# Patient Record
Sex: MALE | Race: WHITE | Employment: OTHER | URBAN - NONMETROPOLITAN AREA
[De-identification: names, ages, dates, MRNs, and addresses within clinical notes are randomized per-mention and may not be internally consistent; named-entity substitution may affect disease eponyms.]

---

## 2020-01-23 LAB
APTT: 24.6 SEC (ref 26–36.2)
BASOPHILS ABSOLUTE: 0 K/UL (ref 0–0.2)
BASOPHILS RELATIVE PERCENT: 0.4 % (ref 0–1)
EOSINOPHILS ABSOLUTE: 0.1 K/UL (ref 0–0.6)
EOSINOPHILS RELATIVE PERCENT: 1.4 % (ref 0–5)
HCT VFR BLD CALC: 46.7 % (ref 42–52)
HEMOGLOBIN: 15 G/DL (ref 14–18)
IMMATURE GRANULOCYTES #: 0 K/UL
INR BLD: 1.11 (ref 0.88–1.18)
LYMPHOCYTES ABSOLUTE: 2.5 K/UL (ref 1.1–4.5)
LYMPHOCYTES RELATIVE PERCENT: 28 % (ref 20–40)
MCH RBC QN AUTO: 29.4 PG (ref 27–31)
MCHC RBC AUTO-ENTMCNC: 32.1 G/DL (ref 33–37)
MCV RBC AUTO: 91.4 FL (ref 80–94)
MONOCYTES ABSOLUTE: 0.8 K/UL (ref 0–0.9)
MONOCYTES RELATIVE PERCENT: 8.3 % (ref 0–10)
NEUTROPHILS ABSOLUTE: 5.6 K/UL (ref 1.5–7.5)
NEUTROPHILS RELATIVE PERCENT: 61.6 % (ref 50–65)
PDW BLD-RTO: 13.5 % (ref 11.5–14.5)
PLATELET # BLD: 242 K/UL (ref 130–400)
PMV BLD AUTO: 9.9 FL (ref 9.4–12.4)
PROTHROMBIN TIME: 13.7 SEC (ref 12–14.6)
RBC # BLD: 5.11 M/UL (ref 4.7–6.1)
WBC # BLD: 9 K/UL (ref 4.8–10.8)

## 2020-05-20 ENCOUNTER — HOSPITAL ENCOUNTER (OUTPATIENT)
Dept: NON INVASIVE DIAGNOSTICS | Age: 60
Discharge: HOME | End: 2020-05-20
Payer: COMMERCIAL

## 2020-05-20 PROCEDURE — 93971 EXTREMITY STUDY: CPT

## 2020-09-09 ENCOUNTER — OFFICE VISIT (OUTPATIENT)
Age: 60
End: 2020-09-09

## 2020-09-09 VITALS — TEMPERATURE: 97.8 F | HEART RATE: 81 BPM | OXYGEN SATURATION: 96 %

## 2020-09-09 PROCEDURE — 99999 PR OFFICE/OUTPT VISIT,PROCEDURE ONLY: CPT | Performed by: NURSE PRACTITIONER

## 2020-09-12 LAB — SARS-COV-2, NAA: NOT DETECTED

## 2020-12-23 ENCOUNTER — HOSPITAL ENCOUNTER (OUTPATIENT)
Dept: NON INVASIVE DIAGNOSTICS | Age: 60
Discharge: HOME | End: 2020-12-23
Payer: COMMERCIAL

## 2020-12-23 PROCEDURE — 93971 EXTREMITY STUDY: CPT

## 2021-01-01 ENCOUNTER — HOSPITAL ENCOUNTER (EMERGENCY)
Age: 61
Discharge: HOME | End: 2021-01-01
Attending: EMERGENCY MEDICINE
Payer: COMMERCIAL

## 2021-01-01 ENCOUNTER — APPOINTMENT (OUTPATIENT)
Dept: CT IMAGING | Age: 61
End: 2021-01-01
Payer: COMMERCIAL

## 2021-01-01 VITALS
SYSTOLIC BLOOD PRESSURE: 122 MMHG | OXYGEN SATURATION: 96 % | HEART RATE: 104 BPM | DIASTOLIC BLOOD PRESSURE: 90 MMHG | RESPIRATION RATE: 18 BRPM | TEMPERATURE: 98.5 F

## 2021-01-01 DIAGNOSIS — M51.36 BULGING OF LUMBAR INTERVERTEBRAL DISC: ICD-10-CM

## 2021-01-01 DIAGNOSIS — M51.36 DDD (DEGENERATIVE DISC DISEASE), LUMBAR: Primary | ICD-10-CM

## 2021-01-01 DIAGNOSIS — M48.061 LUMBAR FORAMINAL STENOSIS: ICD-10-CM

## 2021-01-01 DIAGNOSIS — M54.32 SCIATICA, LEFT SIDE: ICD-10-CM

## 2021-01-01 LAB
ALBUMIN SERPL-MCNC: 4.3 G/DL (ref 3.5–5.2)
ALP BLD-CCNC: 69 U/L (ref 40–130)
ALT SERPL-CCNC: 25 U/L (ref 5–41)
ANION GAP SERPL CALCULATED.3IONS-SCNC: 12 MMOL/L (ref 7–19)
APTT: 28.5 SEC (ref 26–36.2)
AST SERPL-CCNC: 18 U/L (ref 5–40)
BASOPHILS ABSOLUTE: 0.1 K/UL (ref 0–0.2)
BASOPHILS RELATIVE PERCENT: 0.6 % (ref 0–1)
BILIRUB SERPL-MCNC: 0.5 MG/DL (ref 0.2–1.2)
BUN BLDV-MCNC: 15 MG/DL (ref 6–20)
CALCIUM SERPL-MCNC: 9.6 MG/DL (ref 8.6–10)
CHLORIDE BLD-SCNC: 99 MMOL/L (ref 98–111)
CO2: 24 MMOL/L (ref 22–29)
CREAT SERPL-MCNC: 0.9 MG/DL (ref 0.5–1.2)
EOSINOPHILS ABSOLUTE: 0.1 K/UL (ref 0–0.6)
EOSINOPHILS RELATIVE PERCENT: 0.8 % (ref 0–5)
GFR AFRICAN AMERICAN: >59
GFR NON-AFRICAN AMERICAN: >60
GLUCOSE BLD-MCNC: 141 MG/DL (ref 74–109)
HCT VFR BLD CALC: 51 % (ref 42–52)
HEMOGLOBIN: 16.7 G/DL (ref 14–18)
IMMATURE GRANULOCYTES #: 0 K/UL
INR BLD: 1.58 (ref 0.88–1.18)
LYMPHOCYTES ABSOLUTE: 2.6 K/UL (ref 1.1–4.5)
LYMPHOCYTES RELATIVE PERCENT: 24.1 % (ref 20–40)
MCH RBC QN AUTO: 28.8 PG (ref 27–31)
MCHC RBC AUTO-ENTMCNC: 32.7 G/DL (ref 33–37)
MCV RBC AUTO: 87.9 FL (ref 80–94)
MONOCYTES ABSOLUTE: 0.9 K/UL (ref 0–0.9)
MONOCYTES RELATIVE PERCENT: 8.2 % (ref 0–10)
NEUTROPHILS ABSOLUTE: 7.1 K/UL (ref 1.5–7.5)
NEUTROPHILS RELATIVE PERCENT: 65.9 % (ref 50–65)
PDW BLD-RTO: 13.6 % (ref 11.5–14.5)
PLATELET # BLD: 266 K/UL (ref 130–400)
PMV BLD AUTO: 9.5 FL (ref 9.4–12.4)
POTASSIUM REFLEX MAGNESIUM: 4.1 MMOL/L (ref 3.5–5)
PROTHROMBIN TIME: 19 SEC (ref 12–14.6)
RBC # BLD: 5.8 M/UL (ref 4.7–6.1)
SODIUM BLD-SCNC: 135 MMOL/L (ref 136–145)
TOTAL PROTEIN: 7.7 G/DL (ref 6.6–8.7)
WBC # BLD: 10.8 K/UL (ref 4.8–10.8)

## 2021-01-01 PROCEDURE — 85730 THROMBOPLASTIN TIME PARTIAL: CPT

## 2021-01-01 PROCEDURE — 36415 COLL VENOUS BLD VENIPUNCTURE: CPT

## 2021-01-01 PROCEDURE — 99283 EMERGENCY DEPT VISIT LOW MDM: CPT

## 2021-01-01 PROCEDURE — 93971 EXTREMITY STUDY: CPT

## 2021-01-01 PROCEDURE — 72131 CT LUMBAR SPINE W/O DYE: CPT

## 2021-01-01 PROCEDURE — 85025 COMPLETE CBC W/AUTO DIFF WBC: CPT

## 2021-01-01 PROCEDURE — 80053 COMPREHEN METABOLIC PANEL: CPT

## 2021-01-01 PROCEDURE — 85610 PROTHROMBIN TIME: CPT

## 2021-01-01 RX ORDER — TRAZODONE HYDROCHLORIDE 100 MG/1
100 TABLET ORAL NIGHTLY
COMMUNITY

## 2021-01-01 RX ORDER — HYDROCODONE BITARTRATE AND ACETAMINOPHEN 7.5; 325 MG/1; MG/1
1 TABLET ORAL EVERY 6 HOURS PRN
COMMUNITY

## 2021-01-01 RX ORDER — METHOCARBAMOL 750 MG/1
750 TABLET, FILM COATED ORAL 4 TIMES DAILY
COMMUNITY

## 2021-01-01 RX ORDER — METHYLPREDNISOLONE 4 MG/1
TABLET ORAL
Qty: 1 KIT | Refills: 0 | Status: SHIPPED | OUTPATIENT
Start: 2021-01-01 | End: 2021-01-07

## 2021-01-01 RX ORDER — LISINOPRIL 20 MG/1
20 TABLET ORAL DAILY
COMMUNITY

## 2021-01-01 ASSESSMENT — ENCOUNTER SYMPTOMS
EYE DISCHARGE: 0
COLOR CHANGE: 0
PHOTOPHOBIA: 0
COUGH: 0
SHORTNESS OF BREATH: 0
EYE ITCHING: 0
APNEA: 0
BACK PAIN: 1

## 2021-01-01 ASSESSMENT — PAIN SCALES - GENERAL: PAINLEVEL_OUTOF10: 10

## 2021-01-01 NOTE — ED PROVIDER NOTES
[facility] EMERGENCY DEPT  eMERGENCYdEPARTMENT eNCOUnter      Pt Name: Joya Phillips  MRN: [id number]  [place] 1960  Date of evaluation: 1/1/2021  JUDD Chavez    CHIEF COMPLAINT       Chief Complaint   Patient presents with    Leg Pain     left leg, possible sciatic nerve pain         HISTORY OF PRESENT ILLNESS  (Location/Symptom, Timing/Onset, Context/Setting, Quality, Duration, Modifying Factors, Severity.)   Joya Phillips is a 62 y.o. male who presents to the emergency department with complaints of left leg pain worst in groin with lumbago 1 mo onset admits to heavy lifting and yoga hx. He has hx of clot in r leg on xarelto followed by dr Maddison Wilson states has been resolving with US. Denies specific injury. No wounds discoloration or unilateral swelling. Pain is left low back with buttock and thigh involvement. Feels tingling in his entire left leg no weakness walking. No bowel bladder incontinence worse when seating is standing when I enter the room. Pain is dull ache rates as 7/10 when sitting down. No prior disc hx or sciatic hx. Michael Tiwari request #[id number]    0 active cumulative morphine equivalents. HPI    Nursing Notes were reviewed and I agree. REVIEW OF SYSTEMS    (2-9 systems for level 4, 10 or more for level 5)     Review of Systems   Constitutional: Negative for activity change, appetite change, chills and fever. HENT: Negative for congestion and dental problem. Eyes: Negative for photophobia, discharge and itching. Respiratory: Negative for apnea, cough and shortness of breath. Cardiovascular: Negative for chest pain. Musculoskeletal: Positive for arthralgias, back pain and myalgias. Negative for gait problem and neck pain. Skin: Negative for color change, pallor and rash. Neurological: Negative for dizziness, seizures and syncope. Psychiatric/Behavioral: Negative for agitation. The patient is not nervous/anxious.          Except as noted above the remainder of the review of systems was reviewed and negative. PAST MEDICAL HISTORY     Past Medical History:   Diagnosis Date    Arthritis     Insomnia          SURGICAL HISTORY     History reviewed. No pertinent surgical history. CURRENT MEDICATIONS       Discharge Medication List as of 1/1/2021  7:16 PM      CONTINUE these medications which have NOT CHANGED    Details   rivaroxaban (XARELTO) 20 MG TABS tablet Take 20 mg by mouthHistorical Med      lisinopril (PRINIVIL;ZESTRIL) 20 MG tablet Take 20 mg by mouth dailyHistorical Med      methocarbamol (ROBAXIN) 750 MG tablet Take 750 mg by mouth 4 times dailyHistorical Med      traZODone (DESYREL) 100 MG tablet Take 100 mg by mouth nightlyHistorical Med      HYDROcodone-acetaminophen (NORCO) 7.5-325 MG per tablet Take 1 tablet by mouth every 6 hours as needed for Pain. Historical Med      ZOLPIDEM TARTRATE PO Take by mouthHistorical Med             ALLERGIES     Patient has no known allergies. FAMILY HISTORY     History reviewed. No pertinent family history.        SOCIAL HISTORY       Social History     Socioeconomic History    Marital status:      Spouse name: None    Number of children: None    Years of education: None    Highest education level: None   Occupational History    None   Social Needs    Financial resource strain: None    Food insecurity     Worry: None     Inability: None    Transportation needs     Medical: None     Non-medical: None   Tobacco Use    Smoking status: Never Smoker    Smokeless tobacco: Never Used   Substance and Sexual Activity    Alcohol use: Not Currently     Comment: sporadic    Drug use: Not Currently    Sexual activity: Not Currently   Lifestyle    Physical activity     Days per week: None     Minutes per session: None    Stress: None   Relationships    Social connections     Talks on phone: None     Gets together: None     Attends Mandaeism service: None     Active member of club or organization: None Attends meetings of clubs or organizations: None     Relationship status: None    Intimate partner violence     Fear of current or ex partner: None     Emotionally abused: None     Physically abused: None     Forced sexual activity: None   Other Topics Concern    None   Social History Narrative    None       SCREENINGS           PHYSICAL EXAM    (up to 7 forlevel 4, 8 or more for level 5)     ED Triage Vitals   BP Temp Temp src Pulse Resp SpO2 Height Weight   01/01/21 1631 01/01/21 1630 -- 01/01/21 1630 01/01/21 1630 01/01/21 1630 -- --   (!) 122/90 98.5 °F (36.9 °C)  104 18 96 %         Physical Exam  Vitals signs and nursing note reviewed. Constitutional:       General: He is not in acute distress. Appearance: Normal appearance. He is well-developed. He is not diaphoretic. HENT:      Head: Normocephalic and atraumatic. Right Ear: Tympanic membrane, ear canal and external ear normal.      Left Ear: Tympanic membrane, ear canal and external ear normal.   Eyes:      Pupils: Pupils are equal, round, and reactive to light. Neck:      Musculoskeletal: Normal range of motion and neck supple. Trachea: No tracheal deviation. Cardiovascular:      Rate and Rhythm: Normal rate and regular rhythm. Pulses: Normal pulses. Heart sounds: Normal heart sounds. No murmur. Pulmonary:      Effort: Pulmonary effort is normal.      Breath sounds: Normal breath sounds. No stridor. No wheezing. Chest:      Chest wall: No tenderness. Abdominal:      General: Bowel sounds are normal. There is no distension. Palpations: Abdomen is soft. Tenderness: There is no abdominal tenderness. Musculoskeletal: Normal range of motion. General: Tenderness present. No signs of injury. Comments: No pain created with internal external rotation of the left hip. Skin:     General: Skin is warm and dry. Capillary Refill: Capillary refill takes less than 2 seconds.  Both legs of same size no unilateral swelling or coldness. Palpable pulses 2-3+ feet popliteal and inguinal.  Neurological:      General: No focal deficit present. Mental Status: He is alert and oriented to person, place, and time. Mental status is at baseline. Psychiatric:         Mood and Affect: Mood normal.         Behavior: Behavior normal.         Thought Content: Thought content normal.         Judgment: Judgment normal.           DIAGNOSTIC RESULTS     RADIOLOGY:   Non-plain film images such as CT, Ultrasound and MRI are read by the radiologist. Braulio Bosch radiographic images are visualized and preliminarilyinterpreted by No att. providers found with the below findings:      Interpretation per the Radiologist below, if available at the time of this note:    CT Lumbar Spine WO Contrast   Final Result   1.. No evidence of acute fracture. There is bilateral spondylolysis   with 10 mm of anterolisthesis of L5 on S1. Severe bilateral neural   foraminal narrowing is noted at this level. 2. Bulging of the disc as well as facet and ligamentous hypertrophy   are noted at several levels. Endplate spurring also contributes to   central and foraminal stenosis as described above. .   Signed by Dr Maria Alejandra Torres on 1/1/2021 6:15 PM      VL Extremity Venous Left             LABS:  Labs Reviewed   CBC WITH AUTO DIFFERENTIAL - Abnormal; Notable for the following components:       Result Value    MCHC 32.7 (*)     Neutrophils % 65.9 (*)     All other components within normal limits   COMPREHENSIVE METABOLIC PANEL W/ REFLEX TO MG FOR LOW K - Abnormal; Notable for the following components:    Sodium 135 (*)     Glucose 141 (*)     All other components within normal limits   PROTIME-INR - Abnormal; Notable for the following components:    Protime 19.0 (*)     INR 1.58 (*)     All other components within normal limits   APTT       All other labs were within normal range or notreturned as of this dictation.     RE-ASSESSMENT        EMERGENCY DEPARTMENT COURSE and DIFFERENTIAL DIAGNOSIS/MDM:   Vitals:    Vitals:    01/01/21 1630 01/01/21 1631   BP:  (!) 122/90   Pulse: 104    Resp: 18    Temp: 98.5 °F (36.9 °C)    SpO2: 96%        MDM  Findings consistent with bulging disc disease and for mental stenosis have given him medication here that helped significantly. He continues to ambulate without issue I would advise him he can take medication at home for inflammation pain and follow closely with neurosurgery. If anything should change emergently would need to come back here for potential MRI. He will follow up with neurosurgery with referral from his PCP. Discharge at this time. His significant other is a physical therapist to work on exercise at home with him I have texted her over his phone his findings and instructions. PROCEDURES:    Procedures      FINAL IMPRESSION      1. DDD (degenerative disc disease), lumbar    2. Bulging of lumbar intervertebral disc    3. Sciatica, left side    4.  Lumbar foraminal stenosis          DISPOSITION/PLAN   DISPOSITION Decision To Discharge 01/01/2021 07:20:51 PM      PATIENT REFERRED TO:  [address] [place] [address] EMERGENCY DEPT  [facility]  [phone]    If symptoms worsen    Daisy Xiong MD  [address] [place] [address]  [phone]      neurosurgery referral    [facility]  [phone]    Schedule an appointment as soon as possible for a visit         DISCHARGE MEDICATIONS:  Discharge Medication List as of 1/1/2021  7:16 PM      START taking these medications    Details   naproxen (NAPROXEN) 500 MG EC tablet Take 1 tablet by mouth 2 times daily (with meals), Disp-60 tablet, R-3Print      methylPREDNISolone (MEDROL, ASHLY,) 4 MG tablet Take by mouth., Disp-1 kit, R-0Print             (Please note that portions of this note were completed with a voice recognition program.  Efforts were made to edit the dictations but occasionallywords are mis-transcribed.)    Jocy Mariscal [address], [place]  01/01/21 2105

## 2021-01-01 NOTE — ED PROVIDER NOTES
Attending Supervisory Note/Shared Visit    I have reviewed the mid-levels findings and agree. We have discussed the case and reviewed the diagnostic data together. I am in agreement with the plan and disposition.   Hortencia Chase MD  Attending Emergency Physician        Jimmy Carrillo MD  01/01/21 6451

## 2021-01-04 ENCOUNTER — TRANSCRIBE ORDERS (OUTPATIENT)
Dept: ADMINISTRATIVE | Facility: HOSPITAL | Age: 61
End: 2021-01-04

## 2021-01-04 DIAGNOSIS — M54.50 ACUTE LOW BACK PAIN, UNSPECIFIED BACK PAIN LATERALITY, UNSPECIFIED WHETHER SCIATICA PRESENT: Primary | ICD-10-CM

## 2021-01-13 ENCOUNTER — HOSPITAL ENCOUNTER (OUTPATIENT)
Dept: MRI IMAGING | Facility: HOSPITAL | Age: 61
Discharge: HOME | End: 2021-01-13
Admitting: FAMILY MEDICINE

## 2021-01-13 PROCEDURE — 72148 MRI LUMBAR SPINE W/O DYE: CPT

## 2021-02-06 ENCOUNTER — APPOINTMENT (OUTPATIENT)
Dept: GENERAL RADIOLOGY | Age: 61
End: 2021-02-06
Payer: COMMERCIAL

## 2021-02-06 ENCOUNTER — HOSPITAL ENCOUNTER (EMERGENCY)
Age: 61
Discharge: HOME | End: 2021-02-06
Payer: COMMERCIAL

## 2021-02-06 ENCOUNTER — APPOINTMENT (OUTPATIENT)
Dept: CT IMAGING | Age: 61
End: 2021-02-06
Payer: COMMERCIAL

## 2021-02-06 VITALS
HEART RATE: 85 BPM | BODY MASS INDEX: 35.55 KG/M2 | RESPIRATION RATE: 16 BRPM | SYSTOLIC BLOOD PRESSURE: 130 MMHG | HEIGHT: 69 IN | WEIGHT: 240 LBS | DIASTOLIC BLOOD PRESSURE: 83 MMHG | TEMPERATURE: 98 F | OXYGEN SATURATION: 97 %

## 2021-02-06 DIAGNOSIS — M25.559 HIP PAIN: ICD-10-CM
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
## 2021-02-06 DIAGNOSIS — S70.01XA CONTUSION OF RIGHT HIP, INITIAL ENCOUNTER: Primary | ICD-10-CM

## 2021-02-06 PROCEDURE — 99283 EMERGENCY DEPT VISIT LOW MDM: CPT

## 2021-02-06 PROCEDURE — 70450 CT HEAD/BRAIN W/O DYE: CPT

## 2021-02-06 PROCEDURE — 73502 X-RAY EXAM HIP UNI 2-3 VIEWS: CPT

## 2021-02-06 ASSESSMENT — ENCOUNTER SYMPTOMS
ABDOMINAL PAIN: 0
SHORTNESS OF BREATH: 0

## 2021-02-06 NOTE — ED PROVIDER NOTES
(ROBAXIN) 750 MG TABLET    Take 750 mg by mouth 4 times daily    NAPROXEN (NAPROXEN) 500 MG EC TABLET    Take 1 tablet by mouth 2 times daily (with meals)    RIVAROXABAN (XARELTO) 20 MG TABS TABLET    Take 20 mg by mouth    TRAZODONE (DESYREL) 100 MG TABLET    Take 100 mg by mouth nightly    ZOLPIDEM TARTRATE PO    Take by mouth       ALLERGIES     Patient has no known allergies. FAMILY HISTORY     History reviewed. No pertinent family history.        SOCIAL HISTORY       Social History     Socioeconomic History    Marital status:      Spouse name: None    Number of children: None    Years of education: None    Highest education level: None   Occupational History    None   Social Needs    Financial resource strain: None    Food insecurity     Worry: None     Inability: None    Transportation needs     Medical: None     Non-medical: None   Tobacco Use    Smoking status: Never Smoker    Smokeless tobacco: Never Used   Substance and Sexual Activity    Alcohol use: Not Currently     Comment: sporadic    Drug use: Yes     Types: Marijuana     Comment: occ    Sexual activity: Not Currently   Lifestyle    Physical activity     Days per week: None     Minutes per session: None    Stress: None   Relationships    Social connections     Talks on phone: None     Gets together: None     Attends Yazdanism service: None     Active member of club or organization: None     Attends meetings of clubs or organizations: None     Relationship status: None    Intimate partner violence     Fear of current or ex partner: None     Emotionally abused: None     Physically abused: None     Forced sexual activity: None   Other Topics Concern    None   Social History Narrative    None       SCREENINGS             PHYSICAL EXAM    (up to 7 for level 4, 8 or more for level 5)     ED Triage Vitals [02/06/21 1218]   BP Temp Temp src Pulse Resp SpO2 Height Weight   129/88 97.5 °F (36.4 °C) -- 85 18 94 % 5' 9\" (1.753 m) 240 lb (108.9 kg)       Physical Exam  Vitals signs reviewed. HENT:      Head: Atraumatic. Right Ear: External ear normal.      Left Ear: External ear normal.   Eyes:      Conjunctiva/sclera: Conjunctivae normal.      Pupils: Pupils are equal, round, and reactive to light. Neck:      Musculoskeletal: Normal range of motion. Comments: No direct ttp cervical spine  Cardiovascular:      Rate and Rhythm: Normal rate and regular rhythm. Heart sounds: Normal heart sounds. Pulmonary:      Effort: Pulmonary effort is normal.      Breath sounds: Normal breath sounds. Abdominal:      General: Bowel sounds are normal.      Palpations: Abdomen is soft. Tenderness: There is no abdominal tenderness. Musculoskeletal: Normal range of motion. Comments: Painful active flexion of right hip  Mild ttp right lateral hip  No direct ttp thoracic or lumbar spine  CMS intact bilateral lower extremity  No direct ttp bilateral knees  Compartments of bilateral lower extremities are soft and non tender   Skin:     General: Skin is warm and dry. Neurological:      Mental Status: He is alert and oriented to person, place, and time. DIAGNOSTIC RESULTS     EKG: All EKG's are interpreted by the Emergency Department Physician who either signs or Co-signs this chart in the absence of acardiologist.        RADIOLOGY:   Non-plain film images such as CT, Ultrasound andMRI are read by the radiologist. Plain radiographic images are visualized and preliminarily interpreted by the emergency physician with the below findings:        Interpretation per the Radiologist below, if available at the time of this note:    CT HEAD WO CONTRAST   Final Result   1. No acute intracranial process. Signed by Dr Carlo Kurtz on 2/6/2021 1:50 PM      XR HIP 2-3 VW W PELVIS RIGHT   Final Result   Impression:    1. No acute osseous injury or malalignment.    Signed by Dr Carlo Kurtz on 2/6/2021 1:31 PM            ED BEDSIDE ULTRASOUND:   Performed by ED Physician - none    LABS:  Labs Reviewed - No data to display    All other labs were within normal range or not returned as of this dictation. RE-ASSESSMENT     Pt is ambulatory and able to bear weight right hip reports moving \"slow\". He remains in no distress at discharge. EMERGENCY DEPARTMENT COURSE and DIFFERENTIALDIAGNOSIS/MDM:   Vitals:    Vitals:    02/06/21 1218 02/06/21 1231   BP: 129/88 130/83   Pulse: 85 85   Resp: 18 16   Temp: 97.5 °F (36.4 °C) 98 °F (36.7 °C)   SpO2: 94% 97%   Weight: 240 lb (108.9 kg)    Height: 5' 9\" (1.753 m)        MDM      CONSULTS:  None    PROCEDURES:  Unless otherwise notedbelow, none     Procedures    FINAL IMPRESSION     1. Contusion of right hip, initial encounter    2. Hip pain    3.  Fall, initial encounter          DISPOSITION/PLAN   DISPOSITION Decision To Discharge 02/06/2021 02:41:47 PM      PATIENT REFERRED TO:  Vivien Favre, MD  [address] [place] [address]  [phone]    Schedule an appointment as soon as possible for a visit   fail to improve      DISCHARGE MEDICATIONS:       Current Discharge Medication List          (Pleasenote that portions of this note were completed with a voice recognition program.  Efforts were made to edit the dictations but occasionally words are mis-transcribed.)              Ruchi Hussein, KEL  02/06/21 9365

## 2021-06-17 ENCOUNTER — HOSPITAL ENCOUNTER (OUTPATIENT)
Dept: NON INVASIVE DIAGNOSTICS | Age: 61
Discharge: HOME | End: 2021-06-17
Payer: COMMERCIAL

## 2021-06-17 PROCEDURE — 93971 EXTREMITY STUDY: CPT

## 2021-09-10 ENCOUNTER — HOSPITAL ENCOUNTER (OUTPATIENT)
Dept: GENERAL RADIOLOGY | Age: 61
Discharge: HOME | End: 2021-09-10
Payer: COMMERCIAL

## 2021-09-10 DIAGNOSIS — M79.671 RIGHT FOOT PAIN: ICD-10-CM

## 2021-09-10 PROCEDURE — 73630 X-RAY EXAM OF FOOT: CPT

## 2021-11-22 ENCOUNTER — HOSPITAL ENCOUNTER (OUTPATIENT)
Dept: NON INVASIVE DIAGNOSTICS | Age: 61
Discharge: HOME | End: 2021-11-22
Payer: COMMERCIAL

## 2021-11-22 DIAGNOSIS — Z86.718 PERSONAL HISTORY OF DVT (DEEP VEIN THROMBOSIS): ICD-10-CM

## 2021-11-22 PROCEDURE — 93971 EXTREMITY STUDY: CPT

## 2021-12-07 ENCOUNTER — HOSPITAL ENCOUNTER (EMERGENCY)
Age: 61
Discharge: HOME | End: 2021-12-07
Attending: EMERGENCY MEDICINE
Payer: OTHER MISCELLANEOUS

## 2021-12-07 ENCOUNTER — APPOINTMENT (OUTPATIENT)
Dept: CT IMAGING | Age: 61
End: 2021-12-07
Payer: OTHER MISCELLANEOUS

## 2021-12-07 ENCOUNTER — APPOINTMENT (OUTPATIENT)
Dept: GENERAL RADIOLOGY | Age: 61
End: 2021-12-07
Payer: OTHER MISCELLANEOUS

## 2021-12-07 VITALS
TEMPERATURE: 98.1 F | SYSTOLIC BLOOD PRESSURE: 155 MMHG | WEIGHT: 245 LBS | BODY MASS INDEX: 36.29 KG/M2 | HEART RATE: 62 BPM | HEIGHT: 69 IN | OXYGEN SATURATION: 95 % | DIASTOLIC BLOOD PRESSURE: 89 MMHG

## 2021-12-07 DIAGNOSIS — S16.1XXA ACUTE STRAIN OF NECK MUSCLE, INITIAL ENCOUNTER: ICD-10-CM

## 2021-12-07 DIAGNOSIS — S09.90XA CLOSED HEAD INJURY, INITIAL ENCOUNTER: ICD-10-CM
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
## 2021-12-07 PROCEDURE — 70450 CT HEAD/BRAIN W/O DYE: CPT

## 2021-12-07 PROCEDURE — 72125 CT NECK SPINE W/O DYE: CPT

## 2021-12-07 PROCEDURE — 71045 X-RAY EXAM CHEST 1 VIEW: CPT

## 2021-12-07 PROCEDURE — 99284 EMERGENCY DEPT VISIT MOD MDM: CPT

## 2021-12-07 ASSESSMENT — PAIN DESCRIPTION - PAIN TYPE: TYPE: ACUTE PAIN

## 2021-12-07 ASSESSMENT — PAIN DESCRIPTION - FREQUENCY: FREQUENCY: CONTINUOUS

## 2021-12-07 ASSESSMENT — PAIN DESCRIPTION - DESCRIPTORS: DESCRIPTORS: ACHING

## 2021-12-07 ASSESSMENT — PAIN DESCRIPTION - LOCATION: LOCATION: BACK;NECK

## 2021-12-08 ASSESSMENT — ENCOUNTER SYMPTOMS
BACK PAIN: 0
NAUSEA: 0
SHORTNESS OF BREATH: 0
VOMITING: 0
DIARRHEA: 0
ABDOMINAL PAIN: 0
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
## 2022-03-10 ENCOUNTER — OFFICE VISIT (OUTPATIENT)
Dept: FAMILY MEDICINE CLINIC | Facility: CLINIC | Age: 62
End: 2022-03-10

## 2022-03-10 VITALS
HEART RATE: 89 BPM | OXYGEN SATURATION: 98 % | RESPIRATION RATE: 16 BRPM | BODY MASS INDEX: 37.62 KG/M2 | HEIGHT: 69 IN | DIASTOLIC BLOOD PRESSURE: 84 MMHG | WEIGHT: 254 LBS | SYSTOLIC BLOOD PRESSURE: 122 MMHG

## 2022-03-10 DIAGNOSIS — H93.12 TINNITUS OF LEFT EAR: Primary | ICD-10-CM

## 2022-03-10 DIAGNOSIS — Z12.11 SCREEN FOR COLON CANCER: ICD-10-CM

## 2022-03-10 DIAGNOSIS — R42 VERTIGO: ICD-10-CM

## 2022-03-10 PROCEDURE — 99202 OFFICE O/P NEW SF 15 MIN: CPT | Performed by: FAMILY MEDICINE

## 2022-03-10 RX ORDER — LISINOPRIL 20 MG/1
20 TABLET ORAL
COMMUNITY

## 2022-03-10 RX ORDER — TRAZODONE HYDROCHLORIDE 150 MG/1
TABLET ORAL
COMMUNITY
Start: 2022-02-14

## 2022-03-10 RX ORDER — ZOLPIDEM TARTRATE 12.5 MG/1
TABLET, FILM COATED, EXTENDED RELEASE ORAL
COMMUNITY
Start: 2022-02-15

## 2022-03-10 RX ORDER — SAW/PYGEUM/BETA/HERB/D3/B6/ZN 30 MG-25MG
CAPSULE ORAL
COMMUNITY

## 2022-03-10 RX ORDER — CYCLOBENZAPRINE HCL 10 MG
TABLET ORAL
COMMUNITY
Start: 2022-01-05

## 2022-03-10 RX ORDER — METHOCARBAMOL 750 MG/1
750 TABLET, FILM COATED ORAL
COMMUNITY

## 2022-03-10 NOTE — PROGRESS NOTES
"Subjective   Tod Estrada is a 62 y.o. male.     Chief Complaint   Patient presents with   • Tinnitus     Left ear ringing   ( est care new patient)        History of Present Illness     he nots left sided tinnitus for 2 weeks---denies any ear pain or loss of hearing..some vertigo is seen.--noes a blood clot in the right  leg for a year--      Current Outpatient Medications:   •  cyclobenzaprine (FLEXERIL) 10 MG tablet, , Disp: , Rfl:   •  lisinopril (PRINIVIL,ZESTRIL) 20 MG tablet, Take 20 mg by mouth., Disp: , Rfl:   •  Melatonin ER 10 MG tablet controlled-release, Take  by mouth., Disp: , Rfl:   •  methocarbamol (ROBAXIN) 750 MG tablet, Take 750 mg by mouth., Disp: , Rfl:   •  rivaroxaban (XARELTO) 20 MG tablet, Take 20 mg by mouth., Disp: , Rfl:   •  traZODone (DESYREL) 150 MG tablet, , Disp: , Rfl:   •  Unable to find, 1 each 1 (One) Time. Marijuana edible, Disp: , Rfl:   •  zolpidem CR (AMBIEN CR) 12.5 MG CR tablet, , Disp: , Rfl:   Not on File    Patient's Body mass index is 37.51 kg/m². indicating that he is obese (BMI >30). Obesity-related health conditions include the following: none. Obesity is unchanged. BMI is is above average; BMI management plan is completed. We discussed portion control and increasing exercise..      No past medical history on file.  No past surgical history on file.    Review of Systems   Constitutional: Negative.    HENT: Positive for tinnitus.    Eyes: Negative.    Respiratory: Negative.    Cardiovascular: Negative.    Gastrointestinal: Negative.    Endocrine: Negative.    Genitourinary: Negative.    Musculoskeletal: Negative.    Skin: Negative.    Allergic/Immunologic: Negative.    Neurological: Negative.    Hematological: Negative.    Psychiatric/Behavioral: Negative.        Objective  /84   Pulse 89   Resp 16   Ht 175.3 cm (69\")   Wt 115 kg (254 lb)   SpO2 98%   BMI 37.51 kg/m²   Physical Exam  Vitals and nursing note reviewed.   Constitutional:       Appearance: " Normal appearance. He is normal weight.   HENT:      Head: Normocephalic and atraumatic.      Nose: Nose normal.      Mouth/Throat:      Mouth: Mucous membranes are moist.      Pharynx: Oropharynx is clear.   Eyes:      Extraocular Movements: Extraocular movements intact.      Conjunctiva/sclera: Conjunctivae normal.      Pupils: Pupils are equal, round, and reactive to light.   Cardiovascular:      Rate and Rhythm: Normal rate and regular rhythm.      Pulses: Normal pulses.      Heart sounds: Normal heart sounds.   Pulmonary:      Effort: Pulmonary effort is normal.      Breath sounds: Normal breath sounds.   Abdominal:      General: Abdomen is flat. Bowel sounds are normal.      Palpations: Abdomen is soft.   Musculoskeletal:         General: Normal range of motion.      Cervical back: Normal range of motion and neck supple.   Skin:     General: Skin is warm and dry.      Capillary Refill: Capillary refill takes less than 2 seconds.   Neurological:      General: No focal deficit present.      Mental Status: He is alert and oriented to person, place, and time. Mental status is at baseline.   Psychiatric:         Mood and Affect: Mood normal.         Behavior: Behavior normal.         Thought Content: Thought content normal.         Judgment: Judgment normal.         Assessment/Plan   Diagnoses and all orders for this visit:    1. Tinnitus of left ear (Primary)  -     Ambulatory Referral to ENT (Otolaryngology)    2. Vertigo  -     Ambulatory Referral to ENT (Otolaryngology)    3. Screen for colon cancer  -     Ambulatory Referral to Gastroenterology                 Orders Placed This Encounter   Procedures   • Ambulatory Referral to ENT (Otolaryngology)     Referral Priority:   Routine     Referral Type:   Consultation     Referral Reason:   Specialty Services Required     Requested Specialty:   Otolaryngology     Number of Visits Requested:   1   • Ambulatory Referral to Gastroenterology     Referral Priority:    Routine     Referral Type:   Consultation     Referral Reason:   Specialty Services Required     Requested Specialty:   Gastroenterology     Number of Visits Requested:   1       Follow up: 4 month(s)

## 2022-03-15 ENCOUNTER — TRANSCRIBE ORDERS (OUTPATIENT)
Dept: ADMINISTRATIVE | Facility: HOSPITAL | Age: 62
End: 2022-03-15

## 2022-03-15 DIAGNOSIS — R00.2 PALPITATIONS: Primary | ICD-10-CM

## 2022-03-16 ENCOUNTER — HOSPITAL ENCOUNTER (OUTPATIENT)
Dept: CARDIOLOGY | Facility: HOSPITAL | Age: 62
Discharge: HOME | End: 2022-03-16
Admitting: FAMILY MEDICINE

## 2022-03-16 DIAGNOSIS — R00.2 PALPITATIONS: ICD-10-CM

## 2022-03-16 PROCEDURE — 93246 EXT ECG>7D<15D RECORDING: CPT

## 2022-03-22 NOTE — PROGRESS NOTES
Chief Complaint   Patient presents with   • Colonoscopy     Had colon 12-24-15 normal        PCP: Brian Goldman MD  REFER: No ref. provider found    Subjective     HPI    Tod Estrada is a 62 y.o. male who presents to office for preventative maintenance.  There is not  a personal history of colon polyps.  There is not a history of colon cancer.  He does not have complaints of nausea/vomiting, change in bowels, weight loss, no BRBPR, no melena.  There is not a family history of colon cancer in first degree relative.  There is not a family history of colon polyps in first degree relative.  His last colonoscopy-2015 .  Bowels do move on regular basis.    SCANNED - COLONOSCOPY (12/24/2015 00:00)      [facility]Scope (Dr Malik) 2015-normal (10 years)     History reviewed. No pertinent past medical history.  Past Surgical History:   Procedure Laterality Date   • COLONOSCOPY  12/24/2015    normal   • ENDOSCOPY  03/26/2010    questionable young's with active inflammation to the distal esophagus     Outpatient Medications Marked as Taking for the 3/24/22 encounter (Office Visit) with Jose Juan Burns APRN   Medication Sig Dispense Refill   • cyclobenzaprine (FLEXERIL) 10 MG tablet      • Diclofenac Sodium (Voltaren) 1 % gel gel Apply 4 g topically to the appropriate area as directed 4 (Four) Times a Day As Needed.     • Melatonin ER 10 MG tablet controlled-release Take  by mouth.     • rivaroxaban (XARELTO) 20 MG tablet Take 20 mg by mouth.     • traZODone (DESYREL) 150 MG tablet      • Unable to find 1 each 1 (One) Time. Marijuana edible     • zolpidem CR (AMBIEN CR) 12.5 MG CR tablet        No Known Allergies  Social History     Socioeconomic History   • Marital status:    Tobacco Use   • Smoking status: Never Smoker   • Smokeless tobacco: Never Used   Vaping Use   • Vaping Use: Never used   Substance and Sexual Activity   • Alcohol use: Yes     Comment: sometimesz   • Drug use: Yes     Types: Marijuana      Review of Systems   Constitutional: Negative for unexpected weight change.   Respiratory: Negative for shortness of breath.    Cardiovascular: Negative for chest pain.   Gastrointestinal: Negative for abdominal pain and anal bleeding.     Objective   Vitals:    03/24/22 1039   BP: 124/70   Pulse: 70   Temp: 98 °F (36.7 °C)   SpO2: 98%     Physical Exam  Imaging Results (Most Recent)     None        Body mass index is 36.92 kg/m².    Assessment/Plan   Diagnoses and all orders for this visit:    1. Encounter for screening for malignant neoplasm of colon (Primary)      * Surgery not found *      Colonoscopy not due at this time, no personal or family history of colon cancer or colon polyps.  Tod TERRELL Romiealbin will notify office if he develops change in bowels, abdominal pain, rectal bleeding, anemia, weight loss    Jose Juan Burns, APRN  03/24/22        There are no Patient Instructions on file for this visit.

## 2022-03-24 ENCOUNTER — OFFICE VISIT (OUTPATIENT)
Dept: GASTROENTEROLOGY | Facility: CLINIC | Age: 62
End: 2022-03-24

## 2022-03-24 VITALS
TEMPERATURE: 98 F | DIASTOLIC BLOOD PRESSURE: 70 MMHG | BODY MASS INDEX: 37.03 KG/M2 | HEIGHT: 69 IN | WEIGHT: 250 LBS | HEART RATE: 70 BPM | SYSTOLIC BLOOD PRESSURE: 124 MMHG | OXYGEN SATURATION: 98 %

## 2022-03-24 DIAGNOSIS — Z12.11 ENCOUNTER FOR SCREENING FOR MALIGNANT NEOPLASM OF COLON: Primary | ICD-10-CM

## 2022-03-24 PROCEDURE — S0285 CNSLT BEFORE SCREEN COLONOSC: HCPCS | Performed by: NURSE PRACTITIONER

## 2022-04-12 LAB
MAXIMAL PREDICTED HEART RATE: 159 BPM
STRESS TARGET HR: 135 BPM

## 2022-04-12 PROCEDURE — 93248 EXT ECG>7D<15D REV&INTERPJ: CPT | Performed by: INTERNAL MEDICINE

## 2022-05-12 ENCOUNTER — HOSPITAL ENCOUNTER (OUTPATIENT)
Dept: NON INVASIVE DIAGNOSTICS | Age: 62
Discharge: HOME | End: 2022-05-12
Payer: COMMERCIAL

## 2022-05-12 DIAGNOSIS — R07.9 CHEST PAIN, UNSPECIFIED TYPE: ICD-10-CM

## 2022-05-12 PROCEDURE — 93017 CV STRESS TEST TRACING ONLY: CPT

## 2023-05-17 LAB
INR PPP: 2.15 (ref 0.88–1.18)
PROTHROMBIN TIME: 23.3 SEC (ref 12–14.6)

## 2023-05-22 LAB
INR PPP: 3.82 (ref 0.88–1.18)
PROTHROMBIN TIME: 36.5 SEC (ref 12–14.6)

## 2023-08-03 LAB
INR PPP: 2.11 (ref 0.88–1.18)
PROTHROMBIN TIME: 23.1 SEC (ref 12–14.6)

## 2023-10-06 LAB
INR PPP: 4.33 (ref 0.88–1.18)
PROTHROMBIN TIME: 40.6 SEC (ref 12–14.6)

## 2023-11-22 LAB
INR PPP: 1.59 (ref 0.88–1.18)
PROTHROMBIN TIME: 18.6 SEC (ref 12–14.6)

## 2024-01-16 LAB
INR PPP: 1.32 (ref 0.88–1.18)
PROTHROMBIN TIME: 16 SEC (ref 12–14.6)

## 2024-02-14 LAB
INR PPP: 1.4 (ref 0.88–1.18)
PROTHROMBIN TIME: 16.8 SEC (ref 12–14.6)

## 2024-03-26 LAB
INR PPP: 1.67 (ref 0.88–1.18)
PROTHROMBIN TIME: 19.3 SEC (ref 12–14.6)

## 2024-04-24 LAB
INR PPP: 1.32 (ref 0.88–1.18)
PROTHROMBIN TIME: 16 SEC (ref 12–14.6)

## 2024-05-22 ENCOUNTER — OFFICE VISIT (OUTPATIENT)
Dept: INTERNAL MEDICINE | Facility: CLINIC | Age: 64
End: 2024-05-22
Payer: COMMERCIAL

## 2024-05-22 VITALS
SYSTOLIC BLOOD PRESSURE: 140 MMHG | HEART RATE: 67 BPM | HEIGHT: 69 IN | BODY MASS INDEX: 39.77 KG/M2 | OXYGEN SATURATION: 98 % | TEMPERATURE: 97.3 F | WEIGHT: 268.5 LBS | DIASTOLIC BLOOD PRESSURE: 90 MMHG

## 2024-05-22 DIAGNOSIS — M54.9 CHRONIC BACK PAIN, UNSPECIFIED BACK LOCATION, UNSPECIFIED BACK PAIN LATERALITY: ICD-10-CM

## 2024-05-22 DIAGNOSIS — Z79.01 CHRONIC ANTICOAGULATION: ICD-10-CM

## 2024-05-22 DIAGNOSIS — G89.29 CHRONIC BACK PAIN, UNSPECIFIED BACK LOCATION, UNSPECIFIED BACK PAIN LATERALITY: ICD-10-CM

## 2024-05-22 DIAGNOSIS — F51.01 PRIMARY INSOMNIA: ICD-10-CM

## 2024-05-22 DIAGNOSIS — I82.511 CHRONIC DEEP VEIN THROMBOSIS (DVT) OF FEMORAL VEIN OF RIGHT LOWER EXTREMITY: Primary | ICD-10-CM

## 2024-05-22 DIAGNOSIS — I10 ESSENTIAL HYPERTENSION: ICD-10-CM

## 2024-05-22 LAB — INR PPP: 7.1 (ref 2–3)

## 2024-05-22 PROCEDURE — 85610 PROTHROMBIN TIME: CPT | Performed by: INTERNAL MEDICINE

## 2024-05-22 PROCEDURE — 36416 COLLJ CAPILLARY BLOOD SPEC: CPT | Performed by: INTERNAL MEDICINE

## 2024-05-22 PROCEDURE — 99214 OFFICE O/P EST MOD 30 MIN: CPT | Performed by: INTERNAL MEDICINE

## 2024-05-22 RX ORDER — WARFARIN SODIUM 5 MG/1
5 TABLET ORAL
COMMUNITY

## 2024-05-22 NOTE — PROGRESS NOTES
Chief Complaint   Patient presents with    Establish Care         History:  Tod Estrada is a 64 y.o. male who presents today for evaluation of the above problems.      Tod presents today to establish care.  He is a previous patient of Dr. Onofre.  He had set up a visit with Dr. Goldman who is leaving [facility], [place], operated by [facility].    He has a past medical history for right lower extremity DVT first diagnosed May 20, 2020.  He was on Xarelto for some time but this was too expensive.  He is now maintained on warfarin 5 mg daily.  He is not wearing compression stockings today but he does frequently.    He also has hypertension and is on lisinopril 20 mg daily.  Blood pressure usually 120s to 140s systolic at home.  Today initial blood pressure was 150/100 and recheck at the end of the visit was 140/90.    He has chronic back pain for which he sees a chiropractor.  He seldomly takes Flexeril.    He also has insomnia for which he takes nightly Ambien 12.5 mg, as needed trazodone and as needed melatonin.    He also takes THC edible every now and then (about 3 times per week) to help with calm him down for sleep.    HPI      ROS:  Review of Systems  As above    Current Outpatient Medications:     cyclobenzaprine (FLEXERIL) 10 MG tablet, , Disp: , Rfl:     Diclofenac Sodium (Voltaren) 1 % gel gel, Apply 4 g topically to the appropriate area as directed 4 (Four) Times a Day As Needed., Disp: , Rfl:     lisinopril (PRINIVIL,ZESTRIL) 20 MG tablet, Take 1 tablet by mouth., Disp: , Rfl:     Melatonin ER 10 MG tablet controlled-release, Take  by mouth., Disp: , Rfl:     multivitamin with minerals (MULTIVITAMIN ADULT PO), Take 1 tablet by mouth Daily., Disp: , Rfl:     traZODone (DESYREL) 150 MG tablet, , Disp: , Rfl:     Unable to find, 1 each 1 (One) Time. Marijuana edible, Disp: , Rfl:     warfarin (COUMADIN) 5 MG tablet, Take 1 tablet by mouth., Disp: , Rfl:     zolpidem CR (AMBIEN CR) 12.5 MG CR tablet, , Disp: , Rfl:     No results found for:  "\"GLUCOSE\", \"BUN\", \"CREATININE\", \"EGFRRESULT\", \"EGFR\", \"BCR\", \"K\", \"CO2\", \"CALCIUM\", \"PROTENTOTREF\", \"ALBUMIN\", \"BILITOT\", \"AST\", \"ALT\"    WBC   Date Value Ref Range Status   01/01/2021 10.8 4.8 - 10.8 K/uL Final     RBC   Date Value Ref Range Status   01/01/2021 5.80 4.70 - 6.10 M/uL Final     Hemoglobin   Date Value Ref Range Status   01/01/2021 16.7 14.0 - 18.0 g/dL Final     Hematocrit   Date Value Ref Range Status   01/01/2021 51.0 42.0 - 52.0 % Final     MCV   Date Value Ref Range Status   01/01/2021 87.9 80.0 - 94.0 fL Final     MCH   Date Value Ref Range Status   01/01/2021 28.8 27.0 - 31.0 pg Final     MCHC   Date Value Ref Range Status   01/01/2021 32.7 (L) 33.0 - 37.0 g/dL Final     RDW   Date Value Ref Range Status   01/01/2021 13.6 11.5 - 14.5 % Final     MPV   Date Value Ref Range Status   01/01/2021 9.5 9.4 - 12.4 fL Final     Platelets   Date Value Ref Range Status   01/01/2021 266 130 - 400 K/uL Final     Neutrophil Rel %   Date Value Ref Range Status   01/01/2021 65.9 (H) 50.0 - 65.0 % Final     Lymphocyte Rel %   Date Value Ref Range Status   01/01/2021 24.1 20.0 - 40.0 % Final     Monocyte Rel %   Date Value Ref Range Status   01/01/2021 8.2 0.0 - 10.0 % Final     Eosinophil Rel %   Date Value Ref Range Status   01/01/2021 0.8 0.0 - 5.0 % Final     Basophil Rel %   Date Value Ref Range Status   01/01/2021 0.6 0.0 - 1.0 % Final     Neutrophils Absolute   Date Value Ref Range Status   01/01/2021 7.1 1.5 - 7.5 K/uL Final     Lymphocytes Absolute   Date Value Ref Range Status   01/01/2021 2.6 1.1 - 4.5 K/uL Final     Monocytes Absolute   Date Value Ref Range Status   01/01/2021 0.90 0.00 - 0.90 K/uL Final     Eosinophils Absolute   Date Value Ref Range Status   01/01/2021 0.10 0.00 - 0.60 K/uL Final     Basophils Absolute   Date Value Ref Range Status   01/01/2021 0.10 0.00 - 0.20 K/uL Final     Immature Grans, Absolute   Date Value Ref Range Status   01/01/2021 0.0 K/uL Final " "        OBJECTIVE:  Visit Vitals  /90 (BP Location: Left arm, Patient Position: Sitting, Cuff Size: Adult)   Pulse 67   Temp 97.3 °F (36.3 °C) (Temporal)   Ht 175.3 cm (69\")   Wt 122 kg (268 lb 8 oz)   SpO2 98%   BMI 39.65 kg/m²      Physical Exam  Vitals and nursing note reviewed.   Constitutional:       General: He is not in acute distress.     Appearance: Normal appearance. He is well-developed. He is not ill-appearing or toxic-appearing.      Comments: Pleasant   HENT:      Head: Normocephalic and atraumatic.   Eyes:      Pupils: Pupils are equal, round, and reactive to light.   Neck:      Thyroid: No thyromegaly.      Trachea: Phonation normal.   Cardiovascular:      Rate and Rhythm: Normal rate and regular rhythm.      Heart sounds: No murmur heard.  Pulmonary:      Effort: No respiratory distress.      Breath sounds: No wheezing, rhonchi or rales.   Musculoskeletal:      Right lower leg: Edema present.      Left lower leg: No edema.   Skin:     Coloration: Skin is not pale.      Findings: No erythema.   Neurological:      Mental Status: He is alert.   Psychiatric:         Behavior: Behavior normal.         Thought Content: Thought content normal.         Judgment: Judgment normal.         Assessment/Plan    Diagnoses and all orders for this visit:    1. Chronic deep vein thrombosis (DVT) of femoral vein of right lower extremity (Primary)  -     POCT INR    2. Essential hypertension    3. Primary insomnia    4. Chronic back pain, unspecified back location, unspecified back pain laterality    5. Chronic anticoagulation  -     POCT INR      Tod's INR today was quite high at 7.1.  I have asked him to hold his warfarin until he is able to come back on Wednesday to repeat his INR.  He is not able to come in on Tuesday and the office is closed on Monday.    He has been on anticoagulation for the last 4 years.  He had 1 VTE, does not have any known coagulation disorders and no family history of blood clots.  " At his initial diagnosis in 2020 the clot was already chronic, so it is unclear when he actually developed the VTE.  He may not need further anticoagulation, but I would like to do some additional research and make this determination at a later visit.    Blood pressure is high today 140/90 but it is usually well-controlled at home.  Will continue lisinopril 20 mg daily.    He has insomnia for which he takes nightly Ambien, as needed trazodone and melatonin.  He completed consent and agreement form for the Ambien today.    At the next office visit with me we will get his yearly labs and urine drug screen.  It should be noted that he takes a marijuana edible 3 times per week.  He lives in [place] where it is legal.      Return in about 6 months (around 11/22/2024) for Annual physical with me AND INR CHECK ON WEDNESDAY.      PEDRO Pozo MD  09:27 CDT  5/22/2024   Electronically signed

## 2024-05-29 ENCOUNTER — CLINICAL SUPPORT (OUTPATIENT)
Dept: INTERNAL MEDICINE | Facility: CLINIC | Age: 64
End: 2024-05-29
Payer: COMMERCIAL

## 2024-05-29 DIAGNOSIS — G89.29 CHRONIC PAIN OF BOTH KNEES: Primary | ICD-10-CM

## 2024-05-29 DIAGNOSIS — I82.511 CHRONIC DEEP VEIN THROMBOSIS (DVT) OF FEMORAL VEIN OF RIGHT LOWER EXTREMITY: ICD-10-CM

## 2024-05-29 DIAGNOSIS — Z79.01 CHRONIC ANTICOAGULATION: Primary | ICD-10-CM

## 2024-05-29 DIAGNOSIS — M25.562 CHRONIC PAIN OF BOTH KNEES: Primary | ICD-10-CM

## 2024-05-29 DIAGNOSIS — M25.561 CHRONIC PAIN OF BOTH KNEES: Primary | ICD-10-CM

## 2024-05-29 LAB — INR PPP: 1.2 (ref 2–3)

## 2024-05-29 PROCEDURE — 85610 PROTHROMBIN TIME: CPT | Performed by: INTERNAL MEDICINE

## 2024-05-29 PROCEDURE — 36416 COLLJ CAPILLARY BLOOD SPEC: CPT | Performed by: INTERNAL MEDICINE

## 2024-05-29 RX ORDER — ASPIRIN 81 MG/1
81 TABLET ORAL DAILY
Start: 2024-05-29

## 2024-05-29 NOTE — PROGRESS NOTES
Chief complaint: F/u INR on warfarin    History:  Tod Estrada is a 64 y.o. male who presents today for follow-up INR check while on warfarin therapy for Chronic anticoagulation.    OBJECTIVE:  INR value is 1.2 in the office today.    Assessment/Plan    Diagnoses and all orders for this visit:    1. Chronic anticoagulation (Primary)  -     POCT INR    2. Chronic deep vein thrombosis (DVT) of femoral vein of right lower extremity  -     POCT INR      Patient does not need Warfarin anymore and instructed to start taking a baby Aspirin.    No follow-ups on file. Sooner if problems arise.         PEDRO Pozo MD  08:25 CDT   5/29/2024

## 2024-07-08 ENCOUNTER — PATIENT MESSAGE (OUTPATIENT)
Dept: INTERNAL MEDICINE | Facility: CLINIC | Age: 64
End: 2024-07-08
Payer: COMMERCIAL

## 2024-07-08 RX ORDER — LISINOPRIL 20 MG/1
20 TABLET ORAL DAILY
Qty: 90 TABLET | Refills: 1 | Status: SHIPPED | OUTPATIENT
Start: 2024-07-08

## 2024-07-08 RX ORDER — LISINOPRIL 20 MG/1
20 TABLET ORAL
OUTPATIENT
Start: 2024-07-08

## 2024-07-08 NOTE — TELEPHONE ENCOUNTER
Caller: Tod Estrada    Relationship: Self    Best call back number: [phone]     Requested Prescriptions:   Requested Prescriptions     Pending Prescriptions Disp Refills    lisinopril (PRINIVIL,ZESTRIL) 20 MG tablet       Sig: Take 1 tablet by mouth.        Pharmacy where request should be sent: [facility] - [address] [phone] [facility] [phone]      Last office visit with prescribing clinician: 5/22/2024   Last telemedicine visit with prescribing clinician: Visit date not found   Next office visit with prescribing clinician: 11/22/2024     Additional details provided by patient:     Does the patient have less than a 3 day supply:  [x] Yes  [] No    Would you like a call back once the refill request has been completed: [x] Yes [] No      Brice Montesinos Rep   07/08/24 08:11 CDT

## 2024-07-08 NOTE — TELEPHONE ENCOUNTER
From: Tod Estrada  To: VANDANA Pozo  Sent: 7/8/2024 8:06 AM CDT  Subject: Refill    Need refill of Lisinopril to [facility] [phone]

## 2024-08-06 ENCOUNTER — OFFICE VISIT (OUTPATIENT)
Dept: INTERNAL MEDICINE | Facility: CLINIC | Age: 64
End: 2024-08-06
Payer: COMMERCIAL

## 2024-08-06 VITALS
DIASTOLIC BLOOD PRESSURE: 80 MMHG | SYSTOLIC BLOOD PRESSURE: 130 MMHG | WEIGHT: 268.8 LBS | HEART RATE: 79 BPM | HEIGHT: 69 IN | OXYGEN SATURATION: 96 % | BODY MASS INDEX: 39.81 KG/M2 | TEMPERATURE: 98 F

## 2024-08-06 DIAGNOSIS — H60.392 OTHER INFECTIVE ACUTE OTITIS EXTERNA OF LEFT EAR: Primary | ICD-10-CM

## 2024-08-06 PROCEDURE — 99213 OFFICE O/P EST LOW 20 MIN: CPT | Performed by: INTERNAL MEDICINE

## 2024-08-06 RX ORDER — CIPROFLOXACIN AND DEXAMETHASONE 3; 1 MG/ML; MG/ML
4 SUSPENSION/ DROPS AURICULAR (OTIC) 2 TIMES DAILY
Qty: 1 EACH | Refills: 0 | Status: SHIPPED | OUTPATIENT
Start: 2024-08-06 | End: 2024-08-06

## 2024-08-06 RX ORDER — CIPROFLOXACIN AND DEXAMETHASONE 3; 1 MG/ML; MG/ML
4 SUSPENSION/ DROPS AURICULAR (OTIC) 2 TIMES DAILY
Qty: 1 EACH | Refills: 0 | Status: SHIPPED | OUTPATIENT
Start: 2024-08-06 | End: 2024-08-13

## 2024-08-06 NOTE — PROGRESS NOTES
"Chief Complaint   Patient presents with    Earache     Reports fullness and pain in left ear    Tinnitus     Answers submitted by the patient for this visit:  Primary Reason for Visit (Submitted on 8/6/2024)  What is the primary reason for your visit?: Ear Pain  Ear Pain Questionnaire (Submitted on 8/6/2024)  Chief Complaint: Ear pain    History:  Tod Estrada is a 64 y.o. male who presents today for evaluation of the above problems.      DEMARCO Mendez presents today for an acute visit for left ear pain.  Symptoms started 1 week ago.  He reports that he is in and out of the pool and symptoms could be related to this.  He denies any allergy symptoms.  He feels fullness in the left ear and left side of his face is ringing including his ear.  Tries to pop the ear but is unable.  Denies any hearing loss but is more sensitive to sound.    He denies any other symptoms.      ROS:  Review of Systems   HENT:  Positive for ear pain and tinnitus.        Current Outpatient Medications:     aspirin 81 MG EC tablet, Take 1 tablet by mouth Daily., Disp: , Rfl:     ciprofloxacin-dexAMETHasone (Ciprodex) 0.3-0.1 % otic suspension, Administer 4 drops into the left ear 2 (Two) Times a Day for 7 days., Disp: 1 each, Rfl: 0    cyclobenzaprine (FLEXERIL) 10 MG tablet, , Disp: , Rfl:     Diclofenac Sodium (Voltaren) 1 % gel gel, Apply 4 g topically to the appropriate area as directed 4 (Four) Times a Day As Needed., Disp: , Rfl:     lisinopril (PRINIVIL,ZESTRIL) 20 MG tablet, Take 1 tablet by mouth Daily., Disp: 90 tablet, Rfl: 1    Melatonin ER 10 MG tablet controlled-release, Take  by mouth., Disp: , Rfl:     multivitamin with minerals (MULTIVITAMIN ADULT PO), Take 1 tablet by mouth Daily., Disp: , Rfl:     traZODone (DESYREL) 150 MG tablet, , Disp: , Rfl:     Unable to find, 1 each 1 (One) Time. Marijuana edible, Disp: , Rfl:     zolpidem CR (AMBIEN CR) 12.5 MG CR tablet, , Disp: , Rfl:     No results found for: \"GLUCOSE\", \"BUN\", " "\"CREATININE\", \"NA\", \"K\", \"CL\", \"CALCIUM\", \"PROTEINTOT\", \"ALBUMIN\", \"ALT\", \"AST\", \"ALKPHOS\", \"BILITOT\", \"GLOB\", \"AGRATIO\", \"BCR\", \"ANIONGAP\", \"EGFR\"    WBC   Date Value Ref Range Status   01/01/2021 10.8 4.8 - 10.8 K/uL Final     RBC   Date Value Ref Range Status   01/01/2021 5.80 4.70 - 6.10 M/uL Final     Hemoglobin   Date Value Ref Range Status   01/01/2021 16.7 14.0 - 18.0 g/dL Final     Hematocrit   Date Value Ref Range Status   01/01/2021 51.0 42.0 - 52.0 % Final     MCV   Date Value Ref Range Status   01/01/2021 87.9 80.0 - 94.0 fL Final     MCH   Date Value Ref Range Status   01/01/2021 28.8 27.0 - 31.0 pg Final     MCHC   Date Value Ref Range Status   01/01/2021 32.7 (L) 33.0 - 37.0 g/dL Final     RDW   Date Value Ref Range Status   01/01/2021 13.6 11.5 - 14.5 % Final     MPV   Date Value Ref Range Status   01/01/2021 9.5 9.4 - 12.4 fL Final     Platelets   Date Value Ref Range Status   01/01/2021 266 130 - 400 K/uL Final     Neutrophil Rel %   Date Value Ref Range Status   01/01/2021 65.9 (H) 50.0 - 65.0 % Final     Lymphocyte Rel %   Date Value Ref Range Status   01/01/2021 24.1 20.0 - 40.0 % Final     Monocyte Rel %   Date Value Ref Range Status   01/01/2021 8.2 0.0 - 10.0 % Final     Eosinophil Rel %   Date Value Ref Range Status   01/01/2021 0.8 0.0 - 5.0 % Final     Basophil Rel %   Date Value Ref Range Status   01/01/2021 0.6 0.0 - 1.0 % Final     Neutrophils Absolute   Date Value Ref Range Status   01/01/2021 7.1 1.5 - 7.5 K/uL Final     Lymphocytes Absolute   Date Value Ref Range Status   01/01/2021 2.6 1.1 - 4.5 K/uL Final     Monocytes Absolute   Date Value Ref Range Status   01/01/2021 0.90 0.00 - 0.90 K/uL Final     Eosinophils Absolute   Date Value Ref Range Status   01/01/2021 0.10 0.00 - 0.60 K/uL Final     Basophils Absolute   Date Value Ref Range Status   01/01/2021 0.10 0.00 - 0.20 K/uL Final     Immature Grans, Absolute   Date Value Ref Range Status   01/01/2021 0.0 K/uL Final " "        OBJECTIVE:  Visit Vitals  /80 (BP Location: Left arm, Patient Position: Sitting, Cuff Size: Adult)   Pulse 79   Temp 98 °F (36.7 °C) (Temporal)   Ht 175.3 cm (69\")   Wt 122 kg (268 lb 12.8 oz)   SpO2 96%   BMI 39.69 kg/m²      Physical Exam  Constitutional:       General: He is not in acute distress.     Appearance: He is well-developed. He is not ill-appearing or toxic-appearing.   HENT:      Head: Normocephalic and atraumatic.      Right Ear: Hearing, tympanic membrane, ear canal and external ear normal. A middle ear effusion is present.      Left Ear: External ear normal. A middle ear effusion is present. Tympanic membrane is erythematous. Tympanic membrane is not scarred.   Eyes:      Pupils: Pupils are equal, round, and reactive to light.   Neck:      Thyroid: No thyromegaly.      Trachea: Phonation normal.   Pulmonary:      Effort: No respiratory distress.   Skin:     Coloration: Skin is not pale.      Findings: No erythema.   Neurological:      Mental Status: He is alert.   Psychiatric:         Behavior: Behavior normal.         Thought Content: Thought content normal.         Judgment: Judgment normal.           Assessment/Plan      Diagnoses and all orders for this visit:    1. Other infective acute otitis externa of left ear (Primary)  -     Discontinue: ciprofloxacin-dexAMETHasone (Ciprodex) 0.3-0.1 % otic suspension; Administer 4 drops into the left ear 2 (Two) Times a Day.  Dispense: 1 each; Refill: 0  -     ciprofloxacin-dexAMETHasone (Ciprodex) 0.3-0.1 % otic suspension; Administer 4 drops into the left ear 2 (Two) Times a Day for 7 days.  Dispense: 1 each; Refill: 0      Tod has otitis externa, possibly related to swimming in a pool.  I would like to prescribe a course of Ciprodex.  To help decrease inflammation within the sinuses I have also recommended for a course of oral antihistamine such as Zyrtec, and intranasal steroid such as Flonase.  He will let me know if symptoms persist " or worsen.    Return if symptoms worsen or fail to improve, for Next scheduled follow up.      PEDRO Pozo MD  11:12 CDT  8/6/2024   Electronically signed

## 2024-11-01 DIAGNOSIS — F51.01 PRIMARY INSOMNIA: Primary | ICD-10-CM

## 2024-11-01 RX ORDER — ZOLPIDEM TARTRATE 12.5 MG/1
12.5 TABLET, FILM COATED, EXTENDED RELEASE ORAL NIGHTLY
Qty: 30 TABLET | Refills: 5 | Status: SHIPPED | OUTPATIENT
Start: 2024-11-01

## 2024-11-01 NOTE — TELEPHONE ENCOUNTER
Rx Refill Note  Requested Prescriptions     Pending Prescriptions Disp Refills    zolpidem CR (AMBIEN CR) 12.5 MG CR tablet [Pharmacy Med Name: ZOLPIDEM TARTRATE ER 12.5MG TABLET ER] 30 tablet 5     Sig: TAKE ONE TABLET EVERY NIGHT      Last office visit with prescribing clinician: 8/6/2024   Last telemedicine visit with prescribing clinician: Visit date not found   Next office visit with prescribing clinician: 11/22/2024                         Would you like a call back once the refill request has been completed: [] Yes [] No    If the office needs to give you a call back, can they leave a voicemail: [] Yes [] No    Juan Osei MA  11/01/24, 11:30 CDT

## 2024-11-22 ENCOUNTER — OFFICE VISIT (OUTPATIENT)
Dept: INTERNAL MEDICINE | Facility: CLINIC | Age: 64
End: 2024-11-22
Payer: COMMERCIAL

## 2024-11-22 ENCOUNTER — LAB (OUTPATIENT)
Dept: LAB | Facility: HOSPITAL | Age: 64
End: 2024-11-22
Payer: COMMERCIAL

## 2024-11-22 VITALS
HEART RATE: 61 BPM | SYSTOLIC BLOOD PRESSURE: 130 MMHG | WEIGHT: 269.4 LBS | DIASTOLIC BLOOD PRESSURE: 80 MMHG | TEMPERATURE: 97.3 F | BODY MASS INDEX: 39.9 KG/M2 | OXYGEN SATURATION: 100 % | HEIGHT: 69 IN

## 2024-11-22 DIAGNOSIS — Z11.59 ENCOUNTER FOR HEPATITIS C SCREENING TEST FOR LOW RISK PATIENT: ICD-10-CM

## 2024-11-22 DIAGNOSIS — E66.01 CLASS 2 SEVERE OBESITY WITH SERIOUS COMORBIDITY AND BODY MASS INDEX (BMI) OF 39.0 TO 39.9 IN ADULT, UNSPECIFIED OBESITY TYPE: ICD-10-CM

## 2024-11-22 DIAGNOSIS — Z00.00 ENCOUNTER FOR PREVENTIVE CARE: ICD-10-CM

## 2024-11-22 DIAGNOSIS — E66.812 CLASS 2 SEVERE OBESITY WITH SERIOUS COMORBIDITY AND BODY MASS INDEX (BMI) OF 39.0 TO 39.9 IN ADULT, UNSPECIFIED OBESITY TYPE: ICD-10-CM

## 2024-11-22 DIAGNOSIS — I10 ESSENTIAL HYPERTENSION: ICD-10-CM

## 2024-11-22 DIAGNOSIS — Z12.5 PROSTATE CANCER SCREENING: ICD-10-CM

## 2024-11-22 DIAGNOSIS — Z13.31 DEPRESSION SCREEN: ICD-10-CM

## 2024-11-22 DIAGNOSIS — Z00.00 ENCOUNTER FOR PREVENTIVE CARE: Primary | ICD-10-CM

## 2024-11-22 PROBLEM — Z79.01 CHRONIC ANTICOAGULATION: Status: RESOLVED | Noted: 2024-05-22 | Resolved: 2024-11-22

## 2024-11-22 PROBLEM — I82.511 CHRONIC DEEP VEIN THROMBOSIS (DVT) OF FEMORAL VEIN OF RIGHT LOWER EXTREMITY: Status: RESOLVED | Noted: 2024-05-22 | Resolved: 2024-11-22

## 2024-11-22 LAB
ALBUMIN SERPL-MCNC: 4.3 G/DL (ref 3.5–5.2)
ALBUMIN/GLOB SERPL: 1.5 G/DL
ALP SERPL-CCNC: 60 U/L (ref 39–117)
ALT SERPL W P-5'-P-CCNC: 22 U/L (ref 1–41)
ANION GAP SERPL CALCULATED.3IONS-SCNC: 6 MMOL/L (ref 5–15)
AST SERPL-CCNC: 20 U/L (ref 1–40)
BILIRUB SERPL-MCNC: 0.4 MG/DL (ref 0–1.2)
BUN SERPL-MCNC: 17 MG/DL (ref 8–23)
BUN/CREAT SERPL: 18.7 (ref 7–25)
CALCIUM SPEC-SCNC: 9.4 MG/DL (ref 8.6–10.5)
CHLORIDE SERPL-SCNC: 107 MMOL/L (ref 98–107)
CHOLEST SERPL-MCNC: 142 MG/DL (ref 0–200)
CO2 SERPL-SCNC: 28 MMOL/L (ref 22–29)
CREAT SERPL-MCNC: 0.91 MG/DL (ref 0.76–1.27)
DEPRECATED RDW RBC AUTO: 42.9 FL (ref 37–54)
EGFRCR SERPLBLD CKD-EPI 2021: 94.7 ML/MIN/1.73
ERYTHROCYTE [DISTWIDTH] IN BLOOD BY AUTOMATED COUNT: 13.2 % (ref 12.3–15.4)
GLOBULIN UR ELPH-MCNC: 2.9 GM/DL
GLUCOSE SERPL-MCNC: 104 MG/DL (ref 65–99)
HBA1C MFR BLD: 5.7 % (ref 4.8–5.6)
HCT VFR BLD AUTO: 48.1 % (ref 37.5–51)
HCV AB SER QL: NORMAL
HDLC SERPL-MCNC: 34 MG/DL (ref 40–60)
HGB BLD-MCNC: 15.4 G/DL (ref 13–17.7)
LDLC SERPL CALC-MCNC: 92 MG/DL (ref 0–100)
LDLC/HDLC SERPL: 2.68 {RATIO}
MCH RBC QN AUTO: 28.3 PG (ref 26.6–33)
MCHC RBC AUTO-ENTMCNC: 32 G/DL (ref 31.5–35.7)
MCV RBC AUTO: 88.4 FL (ref 79–97)
PLATELET # BLD AUTO: 242 10*3/MM3 (ref 140–450)
PMV BLD AUTO: 9.8 FL (ref 6–12)
POTASSIUM SERPL-SCNC: 5.3 MMOL/L (ref 3.5–5.2)
PROT SERPL-MCNC: 7.2 G/DL (ref 6–8.5)
PSA SERPL-MCNC: 0.69 NG/ML (ref 0–4)
RBC # BLD AUTO: 5.44 10*6/MM3 (ref 4.14–5.8)
SODIUM SERPL-SCNC: 141 MMOL/L (ref 136–145)
TRIGL SERPL-MCNC: 84 MG/DL (ref 0–150)
TSH SERPL DL<=0.05 MIU/L-ACNC: 4 UIU/ML (ref 0.27–4.2)
VLDLC SERPL-MCNC: 16 MG/DL (ref 5–40)
WBC NRBC COR # BLD AUTO: 9.12 10*3/MM3 (ref 3.4–10.8)

## 2024-11-22 PROCEDURE — 80061 LIPID PANEL: CPT

## 2024-11-22 PROCEDURE — 36415 COLL VENOUS BLD VENIPUNCTURE: CPT

## 2024-11-22 PROCEDURE — 99396 PREV VISIT EST AGE 40-64: CPT | Performed by: INTERNAL MEDICINE

## 2024-11-22 PROCEDURE — 84443 ASSAY THYROID STIM HORMONE: CPT

## 2024-11-22 PROCEDURE — 85027 COMPLETE CBC AUTOMATED: CPT

## 2024-11-22 PROCEDURE — 83036 HEMOGLOBIN GLYCOSYLATED A1C: CPT

## 2024-11-22 PROCEDURE — G0103 PSA SCREENING: HCPCS

## 2024-11-22 PROCEDURE — 80053 COMPREHEN METABOLIC PANEL: CPT

## 2024-11-22 PROCEDURE — 86803 HEPATITIS C AB TEST: CPT

## 2024-11-22 RX ORDER — LISINOPRIL 20 MG/1
20 TABLET ORAL DAILY
Qty: 90 TABLET | Refills: 3 | Status: SHIPPED | OUTPATIENT
Start: 2024-11-22

## 2024-11-22 NOTE — PROGRESS NOTES
Chief Complaint   Patient presents with    Annual Exam         History:  Tod Estrada is a 64 y.o. male who presents today for evaluation of the above problems.      HPI    Tod presents today for his annual physical.  Overall, he has been doing well and denies any new issues or complaints.    He is not currently up-to-date on his eye exam but is up-to-date on his dental exam    Denies any tobacco use, illicit drug use or alcohol use.  He does eat an edible at night maybe a couple times per week.  He lives in [place].    He watches his diet describes it as healthy.  He also is active exercising.  He likes to swim but has skin problems after swimming at the [facility].    Social History     Socioeconomic History    Marital status:    Tobacco Use    Smoking status: Never    Smokeless tobacco: Never   Vaping Use    Vaping status: Never Used   Substance and Sexual Activity    Alcohol use: Yes     Comment: sometimesz    Drug use: Yes     Types: Marijuana       PHQ-9 Depression Screening  Little interest or pleasure in doing things? Not at all   Feeling down, depressed, or hopeless? Not at all   PHQ-2 Total Score 0   Trouble falling or staying asleep, or sleeping too much?     Feeling tired or having little energy?     Poor appetite or overeating?     Feeling bad about yourself - or that you are a failure or have let yourself or your family down?     Trouble concentrating on things, such as reading the newspaper or watching television?     Moving or speaking so slowly that other people could have noticed? Or the opposite - being so fidgety or restless that you have been moving around a lot more than usual?     Thoughts that you would be better off dead, or of hurting yourself in some way?     PHQ-9 Total Score     If you checked off any problems, how difficult have these problems made it for you to do your work, take care of things at home, or get along with other people? Not difficult at all  "      PHQ-9 Total Score:      ROS:  Review of Systems   Constitutional:  Negative for activity change, appetite change, fatigue, fever and unexpected weight change.   HENT: Negative.     Eyes: Negative.    Respiratory:  Negative for cough, chest tightness and shortness of breath.    Cardiovascular:  Negative for chest pain, palpitations and leg swelling.   Gastrointestinal:  Negative for abdominal pain, constipation, diarrhea, nausea and vomiting.   Endocrine: Negative for cold intolerance and heat intolerance.   Genitourinary: Negative.    Musculoskeletal: Negative.  Negative for back pain, neck pain and neck stiffness.   Skin:  Negative for rash and wound.   Neurological:  Negative for dizziness, syncope, weakness, light-headedness and headaches.   Hematological:  Negative for adenopathy. Does not bruise/bleed easily.   Psychiatric/Behavioral:  Negative for agitation, behavioral problems and confusion.          Current Outpatient Medications:     aspirin 81 MG EC tablet, Take 1 tablet by mouth Daily., Disp: , Rfl:     cyclobenzaprine (FLEXERIL) 10 MG tablet, , Disp: , Rfl:     Diclofenac Sodium (Voltaren) 1 % gel gel, Apply 4 g topically to the appropriate area as directed 4 (Four) Times a Day As Needed., Disp: , Rfl:     lisinopril (PRINIVIL,ZESTRIL) 20 MG tablet, Take 1 tablet by mouth Daily., Disp: 90 tablet, Rfl: 3    Melatonin ER 10 MG tablet controlled-release, Take  by mouth., Disp: , Rfl:     multivitamin with minerals (MULTIVITAMIN ADULT PO), Take 1 tablet by mouth Daily., Disp: , Rfl:     Unable to find, 1 each 1 (One) Time. Marijuana edible, Disp: , Rfl:     zolpidem CR (AMBIEN CR) 12.5 MG CR tablet, Take 1 tablet by mouth Every Night., Disp: 30 tablet, Rfl: 5    No results found for: \"GLUCOSE\", \"BUN\", \"CREATININE\", \"NA\", \"K\", \"CL\", \"CALCIUM\", \"PROTEINTOT\", \"ALBUMIN\", \"ALT\", \"AST\", \"ALKPHOS\", \"BILITOT\", \"GLOB\", \"AGRATIO\", \"BCR\", \"ANIONGAP\", \"EGFR\"    WBC   Date Value Ref Range Status   11/22/2024 9.12 " 3.40 - 10.80 10*3/mm3 Final   01/01/2021 10.8 4.8 - 10.8 K/uL Final     RBC   Date Value Ref Range Status   11/22/2024 5.44 4.14 - 5.80 10*6/mm3 Final   01/01/2021 5.80 4.70 - 6.10 M/uL Final     Hemoglobin   Date Value Ref Range Status   11/22/2024 15.4 13.0 - 17.7 g/dL Final   01/01/2021 16.7 14.0 - 18.0 g/dL Final     Hematocrit   Date Value Ref Range Status   11/22/2024 48.1 37.5 - 51.0 % Final   01/01/2021 51.0 42.0 - 52.0 % Final     MCV   Date Value Ref Range Status   11/22/2024 88.4 79.0 - 97.0 fL Final   01/01/2021 87.9 80.0 - 94.0 fL Final     MCH   Date Value Ref Range Status   11/22/2024 28.3 26.6 - 33.0 pg Final   01/01/2021 28.8 27.0 - 31.0 pg Final     MCHC   Date Value Ref Range Status   11/22/2024 32.0 31.5 - 35.7 g/dL Final   01/01/2021 32.7 (L) 33.0 - 37.0 g/dL Final     RDW   Date Value Ref Range Status   11/22/2024 13.2 12.3 - 15.4 % Final   01/01/2021 13.6 11.5 - 14.5 % Final     RDW-SD   Date Value Ref Range Status   11/22/2024 42.9 37.0 - 54.0 fl Final     MPV   Date Value Ref Range Status   11/22/2024 9.8 6.0 - 12.0 fL Final   01/01/2021 9.5 9.4 - 12.4 fL Final     Platelets   Date Value Ref Range Status   11/22/2024 242 140 - 450 10*3/mm3 Final   01/01/2021 266 130 - 400 K/uL Final     Neutrophil Rel %   Date Value Ref Range Status   01/01/2021 65.9 (H) 50.0 - 65.0 % Final     Lymphocyte Rel %   Date Value Ref Range Status   01/01/2021 24.1 20.0 - 40.0 % Final     Monocyte Rel %   Date Value Ref Range Status   01/01/2021 8.2 0.0 - 10.0 % Final     Eosinophil Rel %   Date Value Ref Range Status   01/01/2021 0.8 0.0 - 5.0 % Final     Basophil Rel %   Date Value Ref Range Status   01/01/2021 0.6 0.0 - 1.0 % Final     Neutrophils Absolute   Date Value Ref Range Status   01/01/2021 7.1 1.5 - 7.5 K/uL Final     Lymphocytes Absolute   Date Value Ref Range Status   01/01/2021 2.6 1.1 - 4.5 K/uL Final     Monocytes Absolute   Date Value Ref Range Status   01/01/2021 0.90 0.00 - 0.90 K/uL Final  "    Eosinophils Absolute   Date Value Ref Range Status   01/01/2021 0.10 0.00 - 0.60 K/uL Final     Basophils Absolute   Date Value Ref Range Status   01/01/2021 0.10 0.00 - 0.20 K/uL Final     Immature Grans, Absolute   Date Value Ref Range Status   01/01/2021 0.0 K/uL Final         OBJECTIVE:  Visit Vitals  /80 (BP Location: Left arm, Patient Position: Sitting, Cuff Size: Adult)   Pulse 61   Temp 97.3 °F (36.3 °C) (Temporal)   Ht 175.3 cm (69\")   Wt 122 kg (269 lb 6.4 oz)   SpO2 100%   BMI 39.78 kg/m²      Physical Exam  Vitals and nursing note reviewed.   Constitutional:       General: He is not in acute distress.     Appearance: Normal appearance. He is well-developed. He is not ill-appearing or toxic-appearing.      Comments: Pleasant   HENT:      Head: Normocephalic and atraumatic.      Right Ear: Tympanic membrane, ear canal and external ear normal. There is no impacted cerumen.      Left Ear: Tympanic membrane, ear canal and external ear normal. There is no impacted cerumen.   Eyes:      General: No scleral icterus.     Conjunctiva/sclera: Conjunctivae normal.      Pupils: Pupils are equal, round, and reactive to light.   Neck:      Thyroid: No thyromegaly.      Vascular: No JVD.   Cardiovascular:      Rate and Rhythm: Normal rate and regular rhythm.      Heart sounds: Normal heart sounds. No murmur heard.     No friction rub. No gallop.   Pulmonary:      Effort: Pulmonary effort is normal. No respiratory distress.      Breath sounds: Normal breath sounds. No stridor. No wheezing, rhonchi or rales.   Abdominal:      General: Abdomen is flat. There is no distension.      Palpations: Abdomen is soft.      Tenderness: There is no abdominal tenderness.   Musculoskeletal:      Right lower leg: No edema.      Left lower leg: No edema.   Skin:     General: Skin is warm and dry.      Coloration: Skin is not jaundiced.   Neurological:      Mental Status: He is alert.      Cranial Nerves: No cranial nerve " deficit.   Psychiatric:         Mood and Affect: Mood normal.         Behavior: Behavior normal.         Thought Content: Thought content normal.         Judgment: Judgment normal.         Assessment/Plan    Diagnoses and all orders for this visit:    1. Encounter for preventive care (Primary)  -     CBC (No Diff); Future  -     Comprehensive Metabolic Panel; Future  -     Hemoglobin A1c; Future  -     Lipid Panel; Future  -     Hepatitis C Antibody; Future  -     PSA Screen; Future  -     TSH; Future    2. Depression screen    3. Essential hypertension  -     Comprehensive Metabolic Panel; Future  -     lisinopril (PRINIVIL,ZESTRIL) 20 MG tablet; Take 1 tablet by mouth Daily.  Dispense: 90 tablet; Refill: 3    4. Prostate cancer screening  -     PSA Screen; Future    5. Encounter for hepatitis C screening test for low risk patient  -     Hepatitis C Antibody; Future    6. Class 2 severe obesity with serious comorbidity and body mass index (BMI) of 39.0 to 39.9 in adult, unspecified obesity type        Overall, Tod is doing very well.  Will check some lab work as above today.  Depression screen negative with a PHQ of 0.  Blood pressure well-controlled 130/80.  Continue current dose of lisinopril 20 mg.    He has insomnia for which he takes Ambien 2.5 mg at night.  He no longer takes trazodone.    Doing well with baby aspirin.  He was taken off anticoagulation previously as he has had a chronic clot for many years without any changes.    He has had influenza vaccine already this season.    Counseling/Anticipatory Guidance Discussed: nutrition, physical activity, healthy weight, misuse of tobacco, alcohol and drugs, dental health, mental health, immunizations, and screenings    Class 2 Severe Obesity (BMI >=35 and <=39.9). Obesity-related health conditions include the following: hypertension. Obesity is unchanged. BMI is is above average; BMI management plan is completed. We discussed portion control and increasing  exercise.        Return in about 1 year (around 11/22/2025) for Annual physical.    Patient was given instructions and counseling regarding his/her condition or for health maintenance advice. Please see specific information pulled into the AVS if appropriate.     PEDRO Pozo MD  09:01 CST  11/22/2024  Electronically signed

## 2024-11-25 DIAGNOSIS — E87.5 HYPERKALEMIA: Primary | ICD-10-CM

## 2025-01-21 ENCOUNTER — OFFICE VISIT (OUTPATIENT)
Dept: INTERNAL MEDICINE | Facility: CLINIC | Age: 65
End: 2025-01-21
Payer: COMMERCIAL

## 2025-01-21 ENCOUNTER — LAB (OUTPATIENT)
Dept: LAB | Facility: HOSPITAL | Age: 65
End: 2025-01-21
Payer: COMMERCIAL

## 2025-01-21 VITALS
SYSTOLIC BLOOD PRESSURE: 122 MMHG | BODY MASS INDEX: 40.49 KG/M2 | HEART RATE: 78 BPM | TEMPERATURE: 97.4 F | DIASTOLIC BLOOD PRESSURE: 70 MMHG | OXYGEN SATURATION: 99 % | WEIGHT: 273.4 LBS | HEIGHT: 69 IN

## 2025-01-21 DIAGNOSIS — E87.5 HYPERKALEMIA: Primary | ICD-10-CM

## 2025-01-21 DIAGNOSIS — I10 ESSENTIAL HYPERTENSION: ICD-10-CM

## 2025-01-21 DIAGNOSIS — E87.5 HYPERKALEMIA: ICD-10-CM

## 2025-01-21 LAB
ANION GAP SERPL CALCULATED.3IONS-SCNC: 11 MMOL/L (ref 5–15)
BUN SERPL-MCNC: 24 MG/DL (ref 8–23)
BUN/CREAT SERPL: 27.9 (ref 7–25)
CALCIUM SPEC-SCNC: 9.4 MG/DL (ref 8.6–10.5)
CHLORIDE SERPL-SCNC: 102 MMOL/L (ref 98–107)
CO2 SERPL-SCNC: 25 MMOL/L (ref 22–29)
CREAT SERPL-MCNC: 0.86 MG/DL (ref 0.76–1.27)
EGFRCR SERPLBLD CKD-EPI 2021: 96.7 ML/MIN/1.73
GLUCOSE SERPL-MCNC: 90 MG/DL (ref 65–99)
POTASSIUM SERPL-SCNC: 4.5 MMOL/L (ref 3.5–5.2)
SODIUM SERPL-SCNC: 138 MMOL/L (ref 136–145)

## 2025-01-21 PROCEDURE — 36415 COLL VENOUS BLD VENIPUNCTURE: CPT

## 2025-01-21 PROCEDURE — 80048 BASIC METABOLIC PNL TOTAL CA: CPT

## 2025-01-21 PROCEDURE — 99213 OFFICE O/P EST LOW 20 MIN: CPT | Performed by: INTERNAL MEDICINE

## 2025-01-21 NOTE — PROGRESS NOTES
Chief Complaint   Patient presents with    Follow-up     Here to discuss recent lab results and potassium         History:  Tod Estrada is a 65 y.o. male who presents today for evaluation of the above problems.      HPI  History of Present Illness  The patient presents for evaluation of elevated potassium level.    He was previously noted to have a potassium level that was slightly above or at the borderline during his last visit in November 2024. He has since made dietary modifications, including the cessation of consuming flavored water and Powerade, both of which he consumed in significant quantities.    His blood pressure readings have been within normal limits.        ROS:  Review of Systems    See above    Current Outpatient Medications:     aspirin 81 MG EC tablet, Take 1 tablet by mouth Daily., Disp: , Rfl:     cyclobenzaprine (FLEXERIL) 10 MG tablet, , Disp: , Rfl:     Diclofenac Sodium (Voltaren) 1 % gel gel, Apply 4 g topically to the appropriate area as directed 4 (Four) Times a Day As Needed., Disp: , Rfl:     lisinopril (PRINIVIL,ZESTRIL) 20 MG tablet, Take 1 tablet by mouth Daily., Disp: 90 tablet, Rfl: 3    Melatonin ER 10 MG tablet controlled-release, Take  by mouth., Disp: , Rfl:     multivitamin with minerals (MULTIVITAMIN ADULT PO), Take 1 tablet by mouth Daily., Disp: , Rfl:     Unable to find, 1 each 1 (One) Time. Marijuana edible, Disp: , Rfl:     zolpidem CR (AMBIEN CR) 12.5 MG CR tablet, Take 1 tablet by mouth Every Night., Disp: 30 tablet, Rfl: 5    Lab Results   Component Value Date    GLUCOSE 104 (H) 11/22/2024    BUN 17 11/22/2024    CREATININE 0.91 11/22/2024     11/22/2024    K 5.3 (H) 11/22/2024     11/22/2024    CALCIUM 9.4 11/22/2024    PROTEINTOT 7.2 11/22/2024    ALBUMIN 4.3 11/22/2024    ALT 22 11/22/2024    AST 20 11/22/2024    ALKPHOS 60 11/22/2024    BILITOT 0.4 11/22/2024    GLOB 2.9 11/22/2024    AGRATIO 1.5 11/22/2024    BCR 18.7 11/22/2024    ANIONGAP 6.0  11/22/2024    EGFR 94.7 11/22/2024       WBC   Date Value Ref Range Status   11/22/2024 9.12 3.40 - 10.80 10*3/mm3 Final   01/01/2021 10.8 4.8 - 10.8 K/uL Final     RBC   Date Value Ref Range Status   11/22/2024 5.44 4.14 - 5.80 10*6/mm3 Final   01/01/2021 5.80 4.70 - 6.10 M/uL Final     Hemoglobin   Date Value Ref Range Status   11/22/2024 15.4 13.0 - 17.7 g/dL Final   01/01/2021 16.7 14.0 - 18.0 g/dL Final     Hematocrit   Date Value Ref Range Status   11/22/2024 48.1 37.5 - 51.0 % Final   01/01/2021 51.0 42.0 - 52.0 % Final     MCV   Date Value Ref Range Status   11/22/2024 88.4 79.0 - 97.0 fL Final   01/01/2021 87.9 80.0 - 94.0 fL Final     MCH   Date Value Ref Range Status   11/22/2024 28.3 26.6 - 33.0 pg Final   01/01/2021 28.8 27.0 - 31.0 pg Final     MCHC   Date Value Ref Range Status   11/22/2024 32.0 31.5 - 35.7 g/dL Final   01/01/2021 32.7 (L) 33.0 - 37.0 g/dL Final     RDW   Date Value Ref Range Status   11/22/2024 13.2 12.3 - 15.4 % Final   01/01/2021 13.6 11.5 - 14.5 % Final     RDW-SD   Date Value Ref Range Status   11/22/2024 42.9 37.0 - 54.0 fl Final     MPV   Date Value Ref Range Status   11/22/2024 9.8 6.0 - 12.0 fL Final   01/01/2021 9.5 9.4 - 12.4 fL Final     Platelets   Date Value Ref Range Status   11/22/2024 242 140 - 450 10*3/mm3 Final   01/01/2021 266 130 - 400 K/uL Final     Neutrophil Rel %   Date Value Ref Range Status   01/01/2021 65.9 (H) 50.0 - 65.0 % Final     Lymphocyte Rel %   Date Value Ref Range Status   01/01/2021 24.1 20.0 - 40.0 % Final     Monocyte Rel %   Date Value Ref Range Status   01/01/2021 8.2 0.0 - 10.0 % Final     Eosinophil Rel %   Date Value Ref Range Status   01/01/2021 0.8 0.0 - 5.0 % Final     Basophil Rel %   Date Value Ref Range Status   01/01/2021 0.6 0.0 - 1.0 % Final     Neutrophils Absolute   Date Value Ref Range Status   01/01/2021 7.1 1.5 - 7.5 K/uL Final     Lymphocytes Absolute   Date Value Ref Range Status   01/01/2021 2.6 1.1 - 4.5 K/uL Final  "    Monocytes Absolute   Date Value Ref Range Status   01/01/2021 0.90 0.00 - 0.90 K/uL Final     Eosinophils Absolute   Date Value Ref Range Status   01/01/2021 0.10 0.00 - 0.60 K/uL Final     Basophils Absolute   Date Value Ref Range Status   01/01/2021 0.10 0.00 - 0.20 K/uL Final     Immature Grans, Absolute   Date Value Ref Range Status   01/01/2021 0.0 K/uL Final         OBJECTIVE:  Visit Vitals  /70 (BP Location: Left arm, Patient Position: Sitting, Cuff Size: Adult)   Pulse 78   Temp 97.4 °F (36.3 °C) (Temporal)   Ht 175.3 cm (69\")   Wt 124 kg (273 lb 6.4 oz)   SpO2 99%   BMI 40.37 kg/m²      Physical Exam  Vitals and nursing note reviewed.   Constitutional:       General: He is not in acute distress.     Appearance: He is well-developed. He is not ill-appearing or toxic-appearing.      Comments: Pleasant   HENT:      Head: Normocephalic and atraumatic.   Eyes:      Pupils: Pupils are equal, round, and reactive to light.   Neck:      Thyroid: No thyromegaly.      Trachea: Phonation normal.   Pulmonary:      Effort: No respiratory distress.   Skin:     Coloration: Skin is not pale.      Findings: No erythema.   Neurological:      Mental Status: He is alert.   Psychiatric:         Behavior: Behavior normal.         Thought Content: Thought content normal.         Judgment: Judgment normal.       Physical Exam      Results  Laboratory Studies  Potassium level was 5.3 in November 22 2024.    Assessment/Plan      Diagnoses and all orders for this visit:    1. Hyperkalemia (Primary)  -     Basic metabolic panel; Future    2. Essential hypertension      Assessment & Plan  1.  Hyperkalemia  His potassium level was 5.3 in November 2024. He has made dietary changes, including stopping the consumption of flavored water and Powerade, which were high in potassium. A metabolic panel will be ordered to reassess his potassium levels. If the potassium levels remain elevated despite these dietary modifications, " adjustments to his lisinopril medication may be considered.    2.  Hypertension  He is currently on lisinopril 20 mg, which can increase potassium levels. His blood pressure readings have been stable. If his potassium levels remain high, changes to his medication regimen may be necessary.    Follow-up  The patient will follow up in November 2025.      Return for Next scheduled follow up.      PEDRO Pozo MD  07:51 CST  1/21/2025   Electronically signed    Patient or patient representative verbalized consent for the use of Ambient Listening during the visit with  VANDANA Pozo MD for chart documentation. 1/21/2025  08:01 CST